# Patient Record
(demographics unavailable — no encounter records)

---

## 2025-07-02 NOTE — DISCUSSION/SUMMARY
[de-identified] : Right fifth finger pain  HPI Patient is a 14-year-old female accompanied by her mother reports office for evaluation of right fifth finger pain since 6/30/2025.  She was playing flag football when she accidentally jammed her right fifth finger.  She went to [facility] urgent care where they performed x-rays and confirmed that she has a fracture.  She was provided with a splint which she has been in since.  She is right-hand dominant.  Admits to bruising and swelling since the injury.  Range of motion and palpating certain areas aggravate the patient's pain.  Denies any numbness or tingling.  Right finger x-ray taken at [facility] radiology was reviewed in office today on a CD that was brought in by the patient.  It revealed a mildly displaced fracture noted at the base of the fifth middle phalanx.  No dislocation.  Soft tissue swelling noted by PIP joint.  Otherwise, no other significant abnormalities were seen.  Right hand exam is as follows: Mild swelling and ecchymosis noted of fifth finger.  No erythema.  Tenderness palpation over fifth proximal phalanx, fifth PIP joint, fifth middle phalanx.  Mild stiffness with fifth MCP/PIP flexion/extension.  Good active flexion/extension of other fingers.  Grasp strength 4/5 with discomfort.  Intact pulse.  Good capillary fill of all fingers.  Light touch intact throughout.  Assessment/plan Explained fracture on x-ray.  New AlumaFoam splint was applied to right little finger.  She may take this splint on and off for hygiene purposes only.  She was advised to wear it overnight to sleep and throughout the day.  She understands and will comply.  OTC Tylenol or Motrin as needed for pain.  No gym or sports at least until next evaluation.  Follow-up in 2 weeks for repeat right little finger x-rays and further evaluation.  All question/concerns were answered in detail.

## 2025-07-16 NOTE — DISCUSSION/SUMMARY
[de-identified] : Right fifth finger pain  HPI Patient is a 14-year-old female accompanied by her mother reports office for evaluation of right fifth finger pain since 6/30/2025.  She was playing flag football when she accidentally jammed her right fifth finger.  She went to [facility] urgent care where they performed x-rays and confirmed that she has a fracture.  She was provided with a splint which she has been in since.  She is right-hand dominant.  Reports she is improving overall.  Right finger x-ray taken at [facility] radiology was reviewed in office today on a CD that was brought in by the patient.  It revealed a mildly displaced fracture noted at the base of the fifth middle phalanx.  No dislocation.  Soft tissue swelling noted by PIP joint.  Otherwise, no other significant abnormalities were seen.  Right hand exam is as follows: Mild swelling and ecchymosis noted of fifth finger.  No erythema.  Mild tenderness palpation over fifth proximal phalanx, fifth PIP joint, fifth middle phalanx.  Mild stiffness with fifth MCP/PIP flexion/extension.  Good active flexion/extension of other fingers.  Grasp strength 4/5 with discomfort.  Intact pulse.  Good capillary fill of all fingers.  Light touch intact throughout.  Assessment/plan Explained fracture on x-ray.  She can discontinue use of the splint at this time.  Encourage gentle range of motion activity modification to limitations of pain.  Red flag symptoms were discussed.  X-rays were discussed in depth. All questions and concerns addressed to patient's satisfaction. Patient expresses full understanding of treatment plan.